# Patient Record
(demographics unavailable — no encounter records)

---

## 2025-03-26 NOTE — HISTORY OF PRESENT ILLNESS
[Never] : never [TextBox_4] : 75 year old male presents for initial office visit regarding dyspnea.  Longstanding history of dyspnea.  Has been going on for at least 10 years.  Dyspnea is primarily exertional although sometimes occurs at rest.  Underwent cardiac workup and bypass surgery for CAD but this did not result in an improvement in dyspnea symptoms been present for many years. No reported lung disease

## 2025-03-26 NOTE — ASSESSMENT
[FreeTextEntry1] : Unexplained exertional dyspnea.  PFTs are normal.  Does demonstrate oxygen desaturation with exertion.  Recommend CTA for further evaluation  If CTA negative would consider evaluation for sleep apnea as this may be a cause of unexplained dyspnea

## 2025-03-26 NOTE — PROCEDURE
[FreeTextEntry1] : PFTs essentially normal. Patient ambulated on level ground for 3 to 4 minutes had consistent oxygen desaturation When heart rate increased above 110 bpm

## 2025-04-04 NOTE — PROCEDURE
[FreeTextEntry1] : CXR (04/04/2025) reveals a normal sized heart; no evidence of infiltrate or effusion--a normal appearing chest radiograph   Full PFT reveals normal flows; FEV1 was 3.55L which is 118% of predicted, with a 23% improvement at mid-low lung volumes after the BD; hyperinflation; normal diffusion at 19.31, which is 75% of predicted; normal flow volume loop. JOVANNA test revealed severely reduced MIP max of 26%; moderately reduced MEP max of 44%  PFTs were performed to evaluate for SOB  6 minute walk test reveals a low saturation of 96%, max ; walked 391.2 meters   FENO was 37; a normal value being less than 25 Fractional exhaled nitric oxide (FENO) is regarded as a simple, noninvasive method for assessing eosinophilic airway inflammation. Produced by a variety of cells within the lung, nitric oxide (NO) concentrations are generally low in healthy individuals. However, high concentrations of NO appear to be involved in nonspecific host defense mechanisms and chronic inflammatory diseases such as asthma. The American Thor[employer] Society (ATS) therefore has recommended using FENO to aid in the diagnosis and monitoring of eosinophilic airway inflammation and asthma, and for identifying steroid responsive individuals whose chronic respiratory symptoms may be caused by airway inflammation.  Never

## 2025-04-04 NOTE — REASON FOR VISIT
[TextBox_44] : SOB, ?mild asthma, poor mechanics of breathing, Fe deficiency anemia, abnormal CT chest (4 mm nodule in LLL 3/2025)
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
## 2025-04-04 NOTE — ADDENDUM
[FreeTextEntry1] : Documented by Gala Lugo acting as a scribe for Dr. Aj Aldridge on 04/04/2025. All medical record entries made by the Scribe were at my, Dr. Aj Aldridge's, direction and personally dictated by me on 04/04/2025. I have reviewed the chart and agree that the record accurately reflects my personal performance of the history, physical exam, assessment and plan. I have also personally directed, reviewed, and agree with the discharge instructions.
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
## 2025-04-25 NOTE — PLAN
[FreeTextEntry1] : Tylenol 325mg [id number] PRN Supportive care .Isolate at home for 5 days form start of illness. then wear a mask for the next 5 days when leaving the house. Pt has mild symptoms on day 3.  Don't feel Paxlovid is indicated.